# Patient Record
Sex: FEMALE
[De-identification: names, ages, dates, MRNs, and addresses within clinical notes are randomized per-mention and may not be internally consistent; named-entity substitution may affect disease eponyms.]

---

## 2021-09-01 DIAGNOSIS — Z20.822 CONTACT WITH AND (SUSPECTED) EXPOSURE TO COVID-19: ICD-10-CM

## 2021-09-09 ENCOUNTER — APPOINTMENT (OUTPATIENT)
Dept: PULMONOLOGY | Facility: CLINIC | Age: 51
End: 2021-09-09
Payer: SELF-PAY

## 2021-09-09 VITALS
OXYGEN SATURATION: 98 % | SYSTOLIC BLOOD PRESSURE: 107 MMHG | DIASTOLIC BLOOD PRESSURE: 70 MMHG | TEMPERATURE: 98.3 F | HEART RATE: 62 BPM

## 2021-09-09 DIAGNOSIS — B18.2 CHRONIC VIRAL HEPATITIS C: ICD-10-CM

## 2021-09-09 DIAGNOSIS — R05 COUGH: ICD-10-CM

## 2021-09-09 DIAGNOSIS — R93.89 ABNORMAL FINDINGS ON DIAGNOSTIC IMAGING OF OTHER SPECIFIED BODY STRUCTURES: ICD-10-CM

## 2021-09-09 PROCEDURE — 71046 X-RAY EXAM CHEST 2 VIEWS: CPT

## 2021-09-09 PROCEDURE — 99204 OFFICE O/P NEW MOD 45 MIN: CPT | Mod: 25

## 2021-09-09 RX ORDER — OMEPRAZOLE 20 MG/1
20 CAPSULE, DELAYED RELEASE ORAL
Qty: 30 | Refills: 2 | Status: ACTIVE | COMMUNITY
Start: 2021-09-09 | End: 1900-01-01

## 2021-09-09 NOTE — PROCEDURE
[FreeTextEntry1] : Chest x-ray clear\par Report of chest CT reviewed on patient's phone showing 4 mm groundglass nodules.

## 2021-09-09 NOTE — ASSESSMENT
[FreeTextEntry1] : Chronic cough likely unrelated to chest CT findings.  Will initiate protocol for "cough with normal chest x-ray" begin PPI for probable reflux.  Should have chest CT done locally so images can be properly reviewed and findings assessed.  Likely that the cough is unrelated to the chest CT findings

## 2021-09-09 NOTE — REASON FOR VISIT
[Initial] : an initial visit [Abnormal CXR/ Chest CT] : an abnormal CXR/ chest CT [Cough] : cough [Spouse] : spouse

## 2021-09-09 NOTE — HISTORY OF PRESENT ILLNESS
[Never] : never [TextBox_4] : History conducted with  as  with assistance of [employer].  History of cough for the past 2 years primarily at night.  No associated fever chills or night sweats.  Was in [place] 3 months ago and at that time obtained a chest CT reportedly showed 4 mm lung nodules and no other abnormalities.  Full report and images not available and cannot be retrieved.  Denies shortness of breath denies occupational exposure to dust symptoms or toxins.  Denies fever chills or night sweats.  Cough worse at night with recumbent position.  Reports cough being triggered by eating fried chicken.

## 2022-03-22 ENCOUNTER — NON-APPOINTMENT (OUTPATIENT)
Age: 52
End: 2022-03-22